# Patient Record
Sex: FEMALE | Race: WHITE | NOT HISPANIC OR LATINO | Employment: OTHER | URBAN - METROPOLITAN AREA
[De-identification: names, ages, dates, MRNs, and addresses within clinical notes are randomized per-mention and may not be internally consistent; named-entity substitution may affect disease eponyms.]

---

## 2017-04-17 ENCOUNTER — APPOINTMENT (OUTPATIENT)
Dept: MEDICAL GROUP | Facility: MEDICAL CENTER | Age: 77
End: 2017-04-17
Payer: MEDICARE

## 2017-04-21 ENCOUNTER — TELEPHONE (OUTPATIENT)
Dept: MEDICAL GROUP | Facility: MEDICAL CENTER | Age: 77
End: 2017-04-21

## 2017-04-21 NOTE — TELEPHONE ENCOUNTER
Patient is calling cause she moved to [place]. Patient has a new Dr but cant get in till June. Patient wants to know if you will fill her Rx of hydrocodone for her feet and ankle pain. Patient states if you cant do norco if there is something that will help with pain that is not over the counter. Patient states she can not take tylenol or anything over the counter because it causes ear ringing

## 2017-04-25 RX ORDER — TRAMADOL HYDROCHLORIDE 50 MG/1
50 TABLET ORAL EVERY 8 HOURS PRN
Qty: 60 TAB | Refills: 0 | Status: SHIPPED | OUTPATIENT
Start: 2017-04-25

## 2017-05-12 NOTE — TELEPHONE ENCOUNTER
Patient is needing an RX for Norco. She is getting a New Provider but in the mean time if we can give her an RX to hold her till new

## 2017-05-17 RX ORDER — SIMVASTATIN 20 MG
TABLET ORAL
Qty: 30 TAB | Refills: 1 | Status: SHIPPED | OUTPATIENT
Start: 2017-05-17

## 2017-05-17 RX ORDER — LEVOTHYROXINE SODIUM 112 UG/1
TABLET ORAL
Qty: 30 TAB | Refills: 1 | Status: SHIPPED | OUTPATIENT
Start: 2017-05-17

## 2017-05-17 NOTE — TELEPHONE ENCOUNTER
Was the patient seen in the last year in this department? No     Does patient have an active prescription for medications requested? No     Received Request Via: Patient      Pt has moved to [place] and obtained new PCP, but needs refills until she can be seen in June by new provider.

## 2017-09-11 ENCOUNTER — PATIENT OUTREACH (OUTPATIENT)
Dept: HEALTH INFORMATION MANAGEMENT | Facility: OTHER | Age: 77
End: 2017-09-11

## 2017-09-20 NOTE — PROGRESS NOTES
Outcome: No available    Please transfer to Patient Outreach Team at [phone] when patient returns call.    Attempt # 3

## 2017-09-21 NOTE — PROGRESS NOTES
Outcome: Left Message    Please transfer to Patient Outreach Team at [phone] when patient returns call.    Attempt # 4

## 2018-01-08 ENCOUNTER — OFFICE VISIT (OUTPATIENT)
Dept: MEDICAL GROUP | Facility: PHYSICIAN GROUP | Age: 78
End: 2018-01-08
Payer: MEDICARE

## 2018-01-08 VITALS
DIASTOLIC BLOOD PRESSURE: 78 MMHG | RESPIRATION RATE: 16 BRPM | WEIGHT: 148.1 LBS | OXYGEN SATURATION: 95 % | HEART RATE: 72 BPM | SYSTOLIC BLOOD PRESSURE: 125 MMHG | TEMPERATURE: 98.2 F | BODY MASS INDEX: 27.96 KG/M2 | HEIGHT: 61 IN

## 2018-01-08 DIAGNOSIS — E78.2 MIXED HYPERLIPIDEMIA: Chronic | ICD-10-CM

## 2018-01-08 DIAGNOSIS — E03.8 OTHER SPECIFIED HYPOTHYROIDISM: Chronic | ICD-10-CM

## 2018-01-08 DIAGNOSIS — B37.2 CANDIDAL INTERTRIGO: ICD-10-CM

## 2018-01-08 DIAGNOSIS — Z13.1 SCREENING FOR DIABETES MELLITUS: ICD-10-CM

## 2018-01-08 PROCEDURE — 99214 OFFICE O/P EST MOD 30 MIN: CPT | Performed by: NURSE PRACTITIONER

## 2018-01-08 ASSESSMENT — ENCOUNTER SYMPTOMS
FEVER: 0
NERVOUS/ANXIOUS: 1
SPUTUM PRODUCTION: 0
BLOOD IN STOOL: 0
FALLS: 0
FATIGUE: 0
WHEEZING: 0
COUGH: 0
SHORTNESS OF BREATH: 0
DIARRHEA: 0
HEADACHES: 0
CONSTIPATION: 0
DIZZINESS: 0
VOMITING: 0
BACK PAIN: 1
CHILLS: 0
PALPITATIONS: 0
NAUSEA: 0
ABDOMINAL PAIN: 0
BLURRED VISION: 0
DEPRESSION: 0
EYE PAIN: 0

## 2018-01-08 ASSESSMENT — PATIENT HEALTH QUESTIONNAIRE - PHQ9: CLINICAL INTERPRETATION OF PHQ2 SCORE: 0

## 2018-01-08 NOTE — PROGRESS NOTES
Subjective:   Janina Fragoso is a 77 y.o. female here to establish care. Her previous PCP was Dr Quintana. She presents today for the following concerns:      Rash   This is a recurrent problem. The current episode started more than 1 month ago. The problem is unchanged. The affected locations include the groin and abdomen. The rash is characterized by burning, dryness, pain, redness and itchiness. She was exposed to nothing. Pertinent negatives include no cough, diarrhea, eye pain, fatigue, fever, shortness of breath or vomiting. Past treatments include antibiotic cream and anti-itch cream. The treatment provided mild relief.     Hypothyroid  Chronic in nature. She is currently taking levothyroixine 112mcg daily. Her last thyroid levels were drawn on Nov 2016.     Hyperlipemia  Chronic in nature. She is currently taking simvastatin 20mg daily. Her last lipid panel was drawn Nov 2016.    Current medicines (including changes today)  Current Outpatient Prescriptions   Medication Sig Dispense Refill   • miconazole (MICOTIN) 2 % Cream Apply to affected area twice daily 1 Tube 3   • nystatin-triamcinalone (MYCOLOG) 909782-8.1 UNIT/GM-% Ointment Apply 1 Application to affected area(s) 2 times a day. 1 Tube 2   • timolol (TIMOPTIC) 0.5 % Solution Place 1 Drop in both eyes 2 times a day. 1 Bottle 5   • BABY ASPIRIN PO Take 81 mg by mouth.     • letrozole (FEMARA) 2.5 MG TABS Take 2.5 mg by mouth every day.     • levothyroxine (SYNTHROID) 112 MCG Tab TAKE ONE TABLET BY MOUTH ONCE DAILY 30 Tab 1   • simvastatin (ZOCOR) 20 MG Tab TAKE ONE TABLET BY MOUTH ONCE DAILY IN THE EVENING 30 Tab 1   • hydrocodone-acetaminophen (VICODIN) 5-500 MG Tab Take 1-2 Tabs by mouth 3 times a day. 30 Tab 0   • tramadol (ULTRAM) 50 MG Tab Take 1 Tab by mouth every 8 hours as needed. 60 Tab 0   • lansoprazole (PREVACID) 30 MG CAPSULE DELAYED RELEASE TAKE ONE CAPSULE BY MOUTH ONCE DAILY 90 Cap 1   • baclofen (LIORESAL) 10 MG Tab Take 10 mg  by mouth 3 times a day.     • Cholecalciferol (VITAMIN D PO) Take 5,000 Int'l Units/day by mouth.     • Multiple Vitamin (MULTI-VITAMINS PO) Take  by mouth.       No current facility-administered medications for this visit.      She  has a past medical history of Anesthesia; Arrhythmia; Arthritis; Bronchitis; Cancer (CMS-HCC) (2009); Dental disorder; Glaucoma; Gynecological disorder; High cholesterol; Malignant neoplasm of upper-outer quadrant of female breast (CMS-HCC) (8/31/2015); Pain; Pneumonia; and Unspecified disorder of thyroid.    Social History     Social History   • Marital status:      Spouse name: N/A   • Number of children: N/A   • Years of education: N/A     Occupational History   • Not on file.     Social History Main Topics   • Smoking status: Former Smoker     Types: Cigarettes   • Smokeless tobacco: Never Used   • Alcohol use 0.0 oz/week      Comment: social drinking   • Drug use: No   • Sexual activity: No     Other Topics Concern   • Not on file     Social History Narrative   • No narrative on file       Review of Systems   Constitutional: Negative for chills, fatigue, fever and malaise/fatigue.   HENT: Negative for ear pain. Hearing loss: She is deaf.         She has bilateral deafness and requires interpretor     Eyes: Negative for blurred vision and pain.   Respiratory: Negative for cough, sputum production, shortness of breath and wheezing.    Cardiovascular: Negative for chest pain, palpitations and leg swelling.   Gastrointestinal: Negative for abdominal pain, blood in stool, constipation, diarrhea, nausea and vomiting.   Genitourinary: Negative for dysuria.   Musculoskeletal: Positive for back pain. Negative for falls.   Skin: Positive for itching and rash.   Neurological: Negative for dizziness and headaches.   Psychiatric/Behavioral: Negative for depression. The patient is nervous/anxious (history of paranoia).      Depression Screening    Little interest or pleasure in doing  "things?  0 - not at all  Feeling down, depressed , or hopeless? 0 - not at all  Patient Health Questionnaire Score: 0       Objective:     Blood pressure 125/78, pulse 72, temperature 36.8 °C (98.2 °F), resp. rate 16, height 1.549 m (5' 0.98\"), weight 67.2 kg (148 lb 1.6 oz), SpO2 95 %. Body mass index is 28 kg/m².     Physical Exam      Assessment and Plan:   The following treatment plan was discussed    1. Candidal intertrigo  ***  - miconazole (MICOTIN) 2 % Cream; Apply to affected area twice daily  Dispense: 1 Tube; Refill: 3    2. Other specified hypothyroidism  - TSH; Future  - FREE THYROXINE; Future    3. Mixed hyperlipidemia  - CBC WITH DIFFERENTIAL; Future  - COMP METABOLIC PANEL; Future  - LIPID PROFILE; Future    4. Screening for diabetes mellitus  - HEMOGLOBIN A1C; Future        Followup: No Follow-up on file.         "

## 2018-01-08 NOTE — ASSESSMENT & PLAN NOTE
Chronic in nature. She is currently taking levothyroixine 112mcg daily. Her last thyroid levels were drawn on Nov 2016.

## 2018-01-08 NOTE — ASSESSMENT & PLAN NOTE
Chronic in nature. She is currently taking simvastatin 20mg daily. Her last lipid panel was drawn Nov 2016.

## 2018-01-09 NOTE — PROGRESS NOTES
Subjective:   Janina Fragoso is a 77 y.o. female here to establish care. Her previous PCP was Dr Quintana. She presents today for the following concerns:      Rash   This is a recurrent problem. The current episode started more than 1 month ago. The problem is unchanged. The affected locations include the groin and abdomen. The rash is characterized by burning, dryness, pain, redness and itchiness. She was exposed to nothing. Pertinent negatives include no cough, diarrhea, eye pain, fatigue, fever, shortness of breath or vomiting. Past treatments include antibiotic cream and anti-itch cream. The treatment provided mild relief.     Hypothyroid  Chronic in nature. She is currently taking levothyroixine 112mcg daily. Her last thyroid levels were drawn on Nov 2016.     Hyperlipemia  Chronic in nature. She is currently taking simvastatin 20mg daily. Her last lipid panel was drawn Nov 2016.    Current medicines (including changes today)  Current Outpatient Prescriptions   Medication Sig Dispense Refill   • miconazole (MICOTIN) 2 % Cream Apply to affected area twice daily 1 Tube 3   • nystatin-triamcinalone (MYCOLOG) 806267-6.1 UNIT/GM-% Ointment Apply 1 Application to affected area(s) 2 times a day. 1 Tube 2   • timolol (TIMOPTIC) 0.5 % Solution Place 1 Drop in both eyes 2 times a day. 1 Bottle 5   • BABY ASPIRIN PO Take 81 mg by mouth.     • letrozole (FEMARA) 2.5 MG TABS Take 2.5 mg by mouth every day.     • levothyroxine (SYNTHROID) 112 MCG Tab TAKE ONE TABLET BY MOUTH ONCE DAILY 30 Tab 1   • simvastatin (ZOCOR) 20 MG Tab TAKE ONE TABLET BY MOUTH ONCE DAILY IN THE EVENING 30 Tab 1   • hydrocodone-acetaminophen (VICODIN) 5-500 MG Tab Take 1-2 Tabs by mouth 3 times a day. 30 Tab 0   • tramadol (ULTRAM) 50 MG Tab Take 1 Tab by mouth every 8 hours as needed. 60 Tab 0   • lansoprazole (PREVACID) 30 MG CAPSULE DELAYED RELEASE TAKE ONE CAPSULE BY MOUTH ONCE DAILY 90 Cap 1   • baclofen (LIORESAL) 10 MG Tab Take 10 mg  by mouth 3 times a day.     • Cholecalciferol (VITAMIN D PO) Take 5,000 Int'l Units/day by mouth.     • Multiple Vitamin (MULTI-VITAMINS PO) Take  by mouth.       No current facility-administered medications for this visit.      She  has a past medical history of Anesthesia; Arrhythmia; Arthritis; Bronchitis; Cancer (CMS-HCC) (2009); Dental disorder; Glaucoma; Gynecological disorder; High cholesterol; Malignant neoplasm of upper-outer quadrant of female breast (CMS-HCC) (8/31/2015); Pain; Pneumonia; and Unspecified disorder of thyroid.    Social History     Social History   • Marital status:      Spouse name: N/A   • Number of children: N/A   • Years of education: N/A     Occupational History   • Not on file.     Social History Main Topics   • Smoking status: Former Smoker     Types: Cigarettes   • Smokeless tobacco: Never Used   • Alcohol use 0.0 oz/week      Comment: social drinking   • Drug use: No   • Sexual activity: No     Other Topics Concern   • Not on file     Social History Narrative   • No narrative on file       Review of Systems   Constitutional: Negative for chills, fatigue, fever and malaise/fatigue.   HENT: Negative for ear pain. Hearing loss: She is deaf.         She has bilateral deafness and requires interpretor     Eyes: Negative for blurred vision and pain.   Respiratory: Negative for cough, sputum production, shortness of breath and wheezing.    Cardiovascular: Negative for chest pain, palpitations and leg swelling.   Gastrointestinal: Negative for abdominal pain, blood in stool, constipation, diarrhea, nausea and vomiting.   Genitourinary: Negative for dysuria.   Musculoskeletal: Positive for back pain. Negative for falls.   Skin: Positive for itching and rash.   Neurological: Negative for dizziness and headaches.   Psychiatric/Behavioral: Negative for depression. The patient is nervous/anxious (history of paranoia).      Depression Screening    Little interest or pleasure in doing  "things?  0 - not at all  Feeling down, depressed , or hopeless? 0 - not at all  Patient Health Questionnaire Score: 0       Objective:     Blood pressure 125/78, pulse 72, temperature 36.8 °C (98.2 °F), resp. rate 16, height 1.549 m (5' 0.98\"), weight 67.2 kg (148 lb 1.6 oz), SpO2 95 %. Body mass index is 28 kg/m².     Physical Exam   Constitutional: She is oriented to person, place, and time and well-developed, well-nourished, and in no distress. No distress.   HENT:   Head: Normocephalic and atraumatic.   Right Ear: Tympanic membrane, external ear and ear canal normal.   Left Ear: Tympanic membrane, external ear and ear canal normal.   Mouth/Throat: Oropharynx is clear and moist and mucous membranes are normal.   Bilateral deafness   Eyes: Conjunctivae and EOM are normal. Pupils are equal, round, and reactive to light.   Neck: Normal range of motion. Neck supple.   Cardiovascular: Normal rate, regular rhythm, normal heart sounds and intact distal pulses.  Exam reveals no friction rub.    No murmur heard.  Pulmonary/Chest: Effort normal and breath sounds normal. No respiratory distress. She has no wheezes.   Abdominal: Soft. Bowel sounds are normal. She exhibits no distension and no mass. There is no tenderness.   Musculoskeletal: Normal range of motion. She exhibits edema (Trace edema R ankle and foot).   Neurological: She is alert and oriented to person, place, and time. Gait normal.   Skin: Skin is warm and dry.   Psychiatric: Mood, memory, affect and judgment normal.       Assessment and Plan:   The following treatment plan was discussed    1. Candidal intertrigo  Uncontrolled. Order for antifungal cream. Keep skin clean, dry, and prevent skin-to-skin contact with barrier. Avoid restrictive or tight clothing.  - miconazole (MICOTIN) 2 % Cream; Apply to affected area twice daily  Dispense: 1 Tube; Refill: 3    2. Other specified hypothyroidism  Order for thyroid levels. Will reevaluate plan of care based on " results. Continue levothyroxine as prescribed.   - TSH; Future  - FREE THYROXINE; Future    3. Mixed hyperlipidemia  Order for labs. Continue simvastatin as prescribed.  - CBC WITH DIFFERENTIAL; Future  - COMP METABOLIC PANEL; Future  - LIPID PROFILE; Future    4. Screening for diabetes mellitus  - HEMOGLOBIN A1C; Future    Due to patient deafness,  nterpretor service was utilized during today's visit. ALS interpretor today: Manda [id number] ALEA    Followup: Return if symptoms worsen or fail to improve.   I have reviewed and agree with history, assessment and plan for the office encounter with this mid level provider  No face to face encounter  Suggested changes or f/u : none other than listed  Signed by Frank Underwood M.D., MSc, physician supervisor for this midlevel

## 2018-01-17 ENCOUNTER — HOSPITAL ENCOUNTER (OUTPATIENT)
Dept: LAB | Facility: MEDICAL CENTER | Age: 78
End: 2018-01-17
Attending: NURSE PRACTITIONER
Payer: MEDICARE

## 2018-01-17 DIAGNOSIS — E78.2 MIXED HYPERLIPIDEMIA: Chronic | ICD-10-CM

## 2018-01-17 DIAGNOSIS — Z13.1 SCREENING FOR DIABETES MELLITUS: ICD-10-CM

## 2018-01-17 DIAGNOSIS — E03.8 OTHER SPECIFIED HYPOTHYROIDISM: Chronic | ICD-10-CM

## 2018-01-17 LAB
ALBUMIN SERPL BCP-MCNC: 4 G/DL (ref 3.2–4.9)
ALBUMIN/GLOB SERPL: 1.5 G/DL
ALP SERPL-CCNC: 52 U/L (ref 30–99)
ALT SERPL-CCNC: 29 U/L (ref 2–50)
ANION GAP SERPL CALC-SCNC: 9 MMOL/L (ref 0–11.9)
AST SERPL-CCNC: 27 U/L (ref 12–45)
BASOPHILS # BLD AUTO: 1.5 % (ref 0–1.8)
BASOPHILS # BLD: 0.06 K/UL (ref 0–0.12)
BILIRUB SERPL-MCNC: 0.6 MG/DL (ref 0.1–1.5)
BUN SERPL-MCNC: 10 MG/DL (ref 8–22)
CALCIUM SERPL-MCNC: 9.6 MG/DL (ref 8.5–10.5)
CHLORIDE SERPL-SCNC: 105 MMOL/L (ref 96–112)
CHOLEST SERPL-MCNC: 174 MG/DL (ref 100–199)
CO2 SERPL-SCNC: 27 MMOL/L (ref 20–33)
CREAT SERPL-MCNC: 0.54 MG/DL (ref 0.5–1.4)
EOSINOPHIL # BLD AUTO: 0.26 K/UL (ref 0–0.51)
EOSINOPHIL NFR BLD: 6.6 % (ref 0–6.9)
ERYTHROCYTE [DISTWIDTH] IN BLOOD BY AUTOMATED COUNT: 45.9 FL (ref 35.9–50)
EST. AVERAGE GLUCOSE BLD GHB EST-MCNC: 114 MG/DL
GLOBULIN SER CALC-MCNC: 2.6 G/DL (ref 1.9–3.5)
GLUCOSE SERPL-MCNC: 83 MG/DL (ref 65–99)
HBA1C MFR BLD: 5.6 % (ref 0–5.6)
HCT VFR BLD AUTO: 41 % (ref 37–47)
HDLC SERPL-MCNC: 40 MG/DL
HGB BLD-MCNC: 13.2 G/DL (ref 12–16)
IMM GRANULOCYTES # BLD AUTO: 0.01 K/UL (ref 0–0.11)
IMM GRANULOCYTES NFR BLD AUTO: 0.3 % (ref 0–0.9)
LDLC SERPL CALC-MCNC: 115 MG/DL
LYMPHOCYTES # BLD AUTO: 0.71 K/UL (ref 1–4.8)
LYMPHOCYTES NFR BLD: 18.2 % (ref 22–41)
MCH RBC QN AUTO: 30.2 PG (ref 27–33)
MCHC RBC AUTO-ENTMCNC: 32.2 G/DL (ref 33.6–35)
MCV RBC AUTO: 93.8 FL (ref 81.4–97.8)
MONOCYTES # BLD AUTO: 0.79 K/UL (ref 0–0.85)
MONOCYTES NFR BLD AUTO: 20.2 % (ref 0–13.4)
NEUTROPHILS # BLD AUTO: 2.08 K/UL (ref 2–7.15)
NEUTROPHILS NFR BLD: 53.2 % (ref 44–72)
NRBC # BLD AUTO: 0 K/UL
NRBC BLD-RTO: 0 /100 WBC
PLATELET # BLD AUTO: 192 K/UL (ref 164–446)
PMV BLD AUTO: 10.3 FL (ref 9–12.9)
POTASSIUM SERPL-SCNC: 3.8 MMOL/L (ref 3.6–5.5)
PROT SERPL-MCNC: 6.6 G/DL (ref 6–8.2)
RBC # BLD AUTO: 4.37 M/UL (ref 4.2–5.4)
SODIUM SERPL-SCNC: 141 MMOL/L (ref 135–145)
T4 FREE SERPL-MCNC: 2.12 NG/DL (ref 0.53–1.43)
TRIGL SERPL-MCNC: 97 MG/DL (ref 0–149)
TSH SERPL DL<=0.005 MIU/L-ACNC: 0.25 UIU/ML (ref 0.38–5.33)
WBC # BLD AUTO: 3.9 K/UL (ref 4.8–10.8)

## 2018-01-17 PROCEDURE — 83036 HEMOGLOBIN GLYCOSYLATED A1C: CPT

## 2018-01-17 PROCEDURE — 36415 COLL VENOUS BLD VENIPUNCTURE: CPT

## 2018-01-17 PROCEDURE — 80053 COMPREHEN METABOLIC PANEL: CPT

## 2018-01-17 PROCEDURE — 80061 LIPID PANEL: CPT

## 2018-01-17 PROCEDURE — 84439 ASSAY OF FREE THYROXINE: CPT

## 2018-01-17 PROCEDURE — 85025 COMPLETE CBC W/AUTO DIFF WBC: CPT

## 2018-01-17 PROCEDURE — 84443 ASSAY THYROID STIM HORMONE: CPT

## 2018-01-25 ENCOUNTER — OFFICE VISIT (OUTPATIENT)
Dept: MEDICAL GROUP | Facility: PHYSICIAN GROUP | Age: 78
End: 2018-01-25
Payer: MEDICARE

## 2018-01-25 VITALS
HEIGHT: 60 IN | OXYGEN SATURATION: 94 % | HEART RATE: 80 BPM | SYSTOLIC BLOOD PRESSURE: 122 MMHG | BODY MASS INDEX: 28.86 KG/M2 | WEIGHT: 147 LBS | DIASTOLIC BLOOD PRESSURE: 72 MMHG | TEMPERATURE: 97.8 F | RESPIRATION RATE: 14 BRPM

## 2018-01-25 DIAGNOSIS — E03.4 HYPOTHYROIDISM DUE TO ACQUIRED ATROPHY OF THYROID: Chronic | ICD-10-CM

## 2018-01-25 DIAGNOSIS — R10.84 GENERALIZED ABDOMINAL PAIN: ICD-10-CM

## 2018-01-25 DIAGNOSIS — E78.2 MIXED HYPERLIPIDEMIA: Chronic | ICD-10-CM

## 2018-01-25 DIAGNOSIS — H91.90 DEAFNESS, UNSPECIFIED LATERALITY: Chronic | ICD-10-CM

## 2018-01-25 PROCEDURE — 99214 OFFICE O/P EST MOD 30 MIN: CPT | Performed by: FAMILY MEDICINE

## 2018-01-25 RX ORDER — DICYCLOMINE HYDROCHLORIDE 10 MG/1
10 CAPSULE ORAL
Qty: 120 CAP | Refills: 3 | Status: SHIPPED | OUTPATIENT
Start: 2018-01-25

## 2018-01-25 ASSESSMENT — ENCOUNTER SYMPTOMS
NAUSEA: 1
NEUROLOGICAL NEGATIVE: 1
PALPITATIONS: 0
FEVER: 0
PSYCHIATRIC NEGATIVE: 1
HEMOPTYSIS: 0
CONSTITUTIONAL NEGATIVE: 1
DIZZINESS: 0
CHILLS: 0
MUSCULOSKELETAL NEGATIVE: 1
CONSTIPATION: 1
NECK PAIN: 0
COUGH: 0
MYALGIAS: 0
HEADACHES: 0
ANOREXIA: 0
RESPIRATORY NEGATIVE: 1
EYES NEGATIVE: 1
ABDOMINAL PAIN: 1
CARDIOVASCULAR NEGATIVE: 1

## 2018-01-25 NOTE — PROGRESS NOTES
"Subjective:      Janina Fragoso is a 77 y.o. female who presents with GI Problem (passing gas alot )            1. Deafness, unspecified laterality  Used  skype    2. Hypothyroidism due to acquired atrophy of thyroid  Patient is being treated for hypothyroidism, currently taking meds, no symptoms of fast or slow heartbeat and energy level steady. No new hair loss or skin symptoms. Labs have been checked in past year and are stable on current dose.  controlled      3. Mixed hyperlipidemia  Currently treated for HLD, taking meds with no new myalgias or joint pain, watching fats in diet  controlled  Labs ok    4. Generalized abdominal pain   due to new pain will have her do usg and try bentyl  - US-ABDOMEN COMPLETE SURVEY; Future  - dicyclomine (BENTYL) 10 MG Cap; Take 1 Cap by mouth 4 Times a Day,Before Meals and at Bedtime.  Dispense: 120 Cap; Refill: 3    Past Medical History:  No date: Anesthesia      Comment: partially paralyzed vocal cord  No date: Arrhythmia      Comment: irregular heart rate \"fast heart rate\"  No date: Arthritis  No date: Bronchitis      Comment: 5 times  2009: Cancer (CMS-HCC)      Comment: left breast  No date: Dental disorder      Comment: permanent upper tooth  No date: Glaucoma  No date: Gynecological disorder      Comment: fibroid in uterous  No date: High cholesterol  8/31/2015: Malignant neoplasm of upper-outer quadrant of *      Comment: Verify for accuracy   No date: Pain      Comment: back, right hip, thigh 6/10  No date: Pneumonia      Comment: 2 times  No date: Unspecified disorder of thyroid      Comment: thyroid surgery; radiation treatment  Past Surgical History:  11/1/2016: CYST EXCISION Right      Comment: Procedure: CYST EXCISION SEBACEOUS INNER                THIGH;  Surgeon: Chris Holt M.D.;                 Location: SURGERY SAME DAY [facility];                 Service:   3/23/2010: SCAR EXCISION      Comment: Performed by GIOVANY COONEY at " "SURGERY                [facility]  2009: MASTECTOMY      Comment: left  1991: MATIAS BY LAPAROSCOPY  1989: MASTECTOMY      Comment: right  1983: HYSTERECTOMY, TOTAL ABDOMINAL  1965: APPENDECTOMY      Comment: hernia repair  1964,1968,1970: OTHER SURGICAL PROCEDURE      Comment: \"thyroid surgery\" and radiation  No date: THYROIDECTOMY TOTAL  Smoking status: Former Smoker                                                              Packs/day: 0.00      Years: 0.00         Types: Cigarettes  Smokeless tobacco: Never Used                      Alcohol use: Yes           0.0 oz/week     Comment: social drinking    Review of patient's family history indicates:    Lung Disease                   Mother                    Cancer                         Brother                     Comment: bone    Cancer                         Maternal Aunt               Comment: bone cancer    Heart Disease                                            Hypertension                                             Lung Disease                                             Cancer                                                     Comment: leukemia      Current Outpatient Prescriptions: •  dicyclomine (BENTYL) 10 MG Cap, Take 1 Cap by mouth 4 Times a Day,Before Meals and at Bedtime., Disp: 120 Cap, Rfl: 3•  miconazole (MICOTIN) 2 % Cream, Apply to affected area twice daily, Disp: 1 Tube, Rfl: 3•  levothyroxine (SYNTHROID) 112 MCG Tab, TAKE ONE TABLET BY MOUTH ONCE DAILY, Disp: 30 Tab, Rfl: 1•  simvastatin (ZOCOR) 20 MG Tab, TAKE ONE TABLET BY MOUTH ONCE DAILY IN THE EVENING, Disp: 30 Tab, Rfl: 1•  hydrocodone-acetaminophen (VICODIN) 5-500 MG Tab, Take 1-2 Tabs by mouth 3 times a day., Disp: 30 Tab, Rfl: 0•  tramadol (ULTRAM) 50 MG Tab, Take 1 Tab by mouth every 8 hours as needed., Disp: 60 Tab, Rfl: 0•  lansoprazole (PREVACID) 30 MG CAPSULE DELAYED RELEASE, TAKE ONE CAPSULE BY MOUTH ONCE DAILY, Disp: 90 Cap, Rfl: 1•  nystatin-triamcinalone " "(MYCOLOG) 383433-2.1 UNIT/GM-% Ointment, Apply 1 Application to affected area(s) 2 times a day., Disp: 1 Tube, Rfl: 2•  timolol (TIMOPTIC) 0.5 % Solution, Place 1 Drop in both eyes 2 times a day., Disp: 1 Bottle, Rfl: 5•  baclofen (LIORESAL) 10 MG Tab, Take 10 mg by mouth 3 times a day., Disp: , Rfl: •  Cholecalciferol (VITAMIN D PO), Take 5,000 Int'l Units/day by mouth., Disp: , Rfl: •  Multiple Vitamin (MULTI-VITAMINS PO), Take  by mouth., Disp: , Rfl: •  BABY ASPIRIN PO, Take 81 mg by mouth., Disp: , Rfl: •  letrozole (FEMARA) 2.5 MG TABS, Take 2.5 mg by mouth every day., Disp: , Rfl:      Patient was instructed on the use of medications, either prescriptions or OTC and informed on when the appropriate follow up time period should be. In addition, patient was also instructed that should any acute worsening occur that they should notify this clinic asap or call 911.          Abdominal Pain   This is a new problem. The current episode started more than 1 month ago. The onset quality is sudden. The problem occurs intermittently. The problem has been waxing and waning. The pain is located in the generalized abdominal region. The pain is mild. The quality of the pain is colicky and cramping. The abdominal pain does not radiate. Associated symptoms include constipation and nausea. Pertinent negatives include no anorexia, dysuria, fever, headaches or myalgias. Nothing aggravates the pain. The pain is relieved by nothing. She has tried nothing for the symptoms. The treatment provided no relief.       Review of Systems   Constitutional: Negative.  Negative for chills and fever.        Past Medical History:  No date: Anesthesia      Comment: partially paralyzed vocal cord  No date: Arrhythmia      Comment: irregular heart rate \"fast heart rate\"  No date: Arthritis  No date: Bronchitis      Comment: 5 times  2009: Cancer (CMS-HCC)      Comment: left breast  No date: Dental disorder      Comment: permanent upper tooth  No " "date: Glaucoma  No date: Gynecological disorder      Comment: fibroid in uterous  No date: High cholesterol  8/31/2015: Malignant neoplasm of upper-outer quadrant of *      Comment: Verify for accuracy   No date: Pain      Comment: back, right hip, thigh 6/10  No date: Pneumonia      Comment: 2 times  No date: Unspecified disorder of thyroid      Comment: thyroid surgery; radiation treatment  Past Surgical History:  11/1/2016: CYST EXCISION Right      Comment: Procedure: CYST EXCISION SEBACEOUS INNER                THIGH;  Surgeon: Chris Holt M.D.;                 Location: SURGERY SAME DAY [facility];                 Service:   3/23/2010: SCAR EXCISION      Comment: Performed by GIOVANY COONEY at SURGERY                [facility]  2009: MASTECTOMY      Comment: left  1991: MATIAS BY LAPAROSCOPY  1989: MASTECTOMY      Comment: right  1983: HYSTERECTOMY, TOTAL ABDOMINAL  1965: APPENDECTOMY      Comment: hernia repair  1964,1968,1970: OTHER SURGICAL PROCEDURE      Comment: \"thyroid surgery\" and radiation  No date: THYROIDECTOMY TOTAL  Smoking status: Former Smoker                                                              Packs/day: 0.00      Years: 0.00         Types: Cigarettes  Smokeless tobacco: Never Used                      Alcohol use: Yes           0.0 oz/week     Comment: social drinking    Review of patient's family history indicates:    Lung Disease                   Mother                    Cancer                         Brother                     Comment: bone    Cancer                         Maternal Aunt               Comment: bone cancer    Heart Disease                                            Hypertension                                             Lung Disease                                             Cancer                                                     Comment: leukemia     HENT: Negative.    Eyes: Negative.    Respiratory: Negative.  Negative for cough and " hemoptysis.    Cardiovascular: Negative.  Negative for chest pain and palpitations.   Gastrointestinal: Positive for abdominal pain, constipation and nausea. Negative for anorexia.   Genitourinary: Negative.  Negative for dysuria and urgency.   Musculoskeletal: Negative.  Negative for myalgias and neck pain.   Skin: Negative.  Negative for rash.   Neurological: Negative.  Negative for dizziness and headaches.   Endo/Heme/Allergies: Negative.    Psychiatric/Behavioral: Negative.  Negative for suicidal ideas.          Objective:     /72   Pulse 80   Temp 36.6 °C (97.8 °F)   Resp 14   Ht 1.524 m (5')   Wt 66.7 kg (147 lb)   SpO2 94%   BMI 28.71 kg/m²      Physical Exam   Constitutional: She is oriented to person, place, and time. She appears well-developed and well-nourished. No distress.   HENT:   Head: Normocephalic and atraumatic.   Right Ear: External ear normal.   Left Ear: External ear normal.   Nose: Nose normal.   Mouth/Throat: Oropharynx is clear and moist. No oropharyngeal exudate.   Eyes: Pupils are equal, round, and reactive to light. Right eye exhibits no discharge. Left eye exhibits no discharge. No scleral icterus.   Neck: Normal range of motion. Neck supple. No JVD present. No tracheal deviation present. No thyromegaly present.   Cardiovascular: Normal rate, regular rhythm, normal heart sounds and intact distal pulses.  Exam reveals no gallop and no friction rub.    No murmur heard.  Pulmonary/Chest: Effort normal and breath sounds normal. No stridor. No respiratory distress. She has no wheezes. She has no rales. She exhibits no tenderness.   Abdominal: Soft. She exhibits no distension. There is tenderness in the left upper quadrant. There is no rigidity, no rebound, no guarding, no CVA tenderness, no tenderness at McBurney's point and negative Luis's sign.   Lymphadenopathy:     She has no cervical adenopathy.   Neurological: She is alert and oriented to person, place, and time. No  cranial nerve deficit.   Skin: She is not diaphoretic.   Psychiatric: She has a normal mood and affect. Her behavior is normal. Judgment and thought content normal.   Nursing note and vitals reviewed.              Assessment/Plan:     1. Deafness, unspecified laterality      2. Hypothyroidism due to acquired atrophy of thyroid      3. Mixed hyperlipidemia      4. Generalized abdominal pain    - US-ABDOMEN COMPLETE SURVEY; Future  - dicyclomine (BENTYL) 10 MG Cap; Take 1 Cap by mouth 4 Times a Day,Before Meals and at Bedtime.  Dispense: 120 Cap; Refill: 3

## 2018-01-26 ENCOUNTER — TELEPHONE (OUTPATIENT)
Dept: MEDICAL GROUP | Facility: PHYSICIAN GROUP | Age: 78
End: 2018-01-26

## 2018-01-26 NOTE — TELEPHONE ENCOUNTER
Pt called with an interpretor, pt states she is not experiencing any relief. I advised her that the medication is to relief the pain, and since she is not then to go to the Er. Pt agreed and stated she will call a friend to take her or to call an ambulance.    Pt also states her US was scheduled for 02/01/2018.

## 2018-01-29 ENCOUNTER — TELEPHONE (OUTPATIENT)
Dept: MEDICAL GROUP | Facility: PHYSICIAN GROUP | Age: 78
End: 2018-01-29

## 2018-01-29 NOTE — TELEPHONE ENCOUNTER
I called the pts number, there was no answer. Left message with service regarding to set up an appointment to get different medication and so we can go over the records with [facility]. I advised the pt to call back with any more questions regarding this issue.

## 2018-01-29 NOTE — TELEPHONE ENCOUNTER
Pt is also wanting to know if there is anything else she can do regarding the diarrhea. The pt is stating this is still a big concern.

## 2018-01-29 NOTE — TELEPHONE ENCOUNTER
From previous Te:    Pt called and stated she went to the ER as advised on 01/26/18. She was dx with a stomach infection by an CT- they prescribed her Flagyl and Cipro. The pt is allergic to Flagyl and read that Cipro isn't advised for seniors so she is requesting new medications. She advised me that she has taken doxycycline or erythromycin, and that was worked in the past.     Is there anything we can give her without an appointment? Pt is persistent that she is very limited for a ride.

## 2018-01-29 NOTE — TELEPHONE ENCOUNTER
Pt called and stated she went to the ER as advised on 01/26/18. She was dx with a stomach infection by an CT- they prescribed her Flagyl and Cipro. The pt is allergic to Flagyl and read that Cipro isn't advised for seniors so she is requesting new medications. She advised me that she has taken doxycycline or erythromycin, and that was worked in the past. The pt also stated her Klor-Con pills were too big and she will discuss with the pharmacy to get something smaller.     Spoke to Omayra, Omayra advised me to contact the pt about coming in for an appointment with Dr. Underwood on 01/30/18 at the times of 9:45am or 1pm.

## 2018-01-30 NOTE — TELEPHONE ENCOUNTER
Called pt, US is 2/2/18. Pt is still hesitant on taking the Cipro as I directed the pt that the doctor is advising that medication. Pt said she understood about the Cipro, but the pt is stating the medication is too big. She can't swallow this. Is there another method she can do to take this medication? She doesn't want to take this in the pill form as is.

## 2021-02-21 NOTE — TELEPHONE ENCOUNTER
----- Message from Freda Herbert sent at 5/12/2017  2:51 PM PDT -----  Regarding: RX Refill  Patient was requesting a refill on her hydrocodone    spontaneous